# Patient Record
Sex: MALE | Race: WHITE
[De-identification: names, ages, dates, MRNs, and addresses within clinical notes are randomized per-mention and may not be internally consistent; named-entity substitution may affect disease eponyms.]

---

## 2017-03-29 ENCOUNTER — HOSPITAL ENCOUNTER (OUTPATIENT)
Dept: HOSPITAL 45 - C.LABBC | Age: 57
Discharge: HOME | End: 2017-03-29
Attending: ORTHOPAEDIC SURGERY
Payer: COMMERCIAL

## 2017-03-29 DIAGNOSIS — M48.06: Primary | ICD-10-CM

## 2017-03-29 LAB
ALP SERPL-CCNC: 75 U/L (ref 45–117)
ALT SERPL-CCNC: 70 U/L (ref 12–78)
ANION GAP SERPL CALC-SCNC: 6 MMOL/L (ref 3–11)
AST SERPL-CCNC: 51 U/L (ref 15–37)
BASOPHILS # BLD: 0.02 K/UL (ref 0–0.2)
BASOPHILS NFR BLD: 0.3 %
BUN SERPL-MCNC: 16 MG/DL (ref 7–18)
CHLORIDE SERPL-SCNC: 105 MMOL/L (ref 98–107)
CO2 SERPL-SCNC: 27 MMOL/L (ref 21–32)
COMPLETE: YES
CREAT SERPL-MCNC: 1.2 MG/DL (ref 0.6–1.4)
EOSINOPHIL NFR BLD AUTO: 214 K/UL (ref 130–400)
HCT VFR BLD CALC: 46.1 % (ref 42–52)
IG%: 0.2 %
IMM GRANULOCYTES NFR BLD AUTO: 32.2 %
LYMPHOCYTES # BLD: 1.85 K/UL (ref 1.2–3.4)
MCH RBC QN AUTO: 32.3 PG (ref 25–34)
MCHC RBC AUTO-ENTMCNC: 34.7 G/DL (ref 32–36)
MCV RBC AUTO: 92.9 FL (ref 80–100)
MONOCYTES NFR BLD: 11 %
NEUTROPHILS # BLD AUTO: 1.4 %
NEUTROPHILS NFR BLD AUTO: 54.9 %
PMV BLD AUTO: 10.4 FL (ref 7.4–10.4)
POTASSIUM SERPL-SCNC: 4.2 MMOL/L (ref 3.5–5.1)
RBC # BLD AUTO: 4.96 M/UL (ref 4.7–6.1)
SODIUM SERPL-SCNC: 138 MMOL/L (ref 136–145)
WBC # BLD AUTO: 5.75 K/UL (ref 4.8–10.8)

## 2018-04-20 ENCOUNTER — HOSPITAL ENCOUNTER (INPATIENT)
Dept: HOSPITAL 45 - C.EDB | Age: 58
LOS: 2 days | Discharge: HOME | DRG: 872 | End: 2018-04-22
Attending: HOSPITALIST | Admitting: HOSPITALIST
Payer: COMMERCIAL

## 2018-04-20 VITALS — SYSTOLIC BLOOD PRESSURE: 126 MMHG | TEMPERATURE: 97.52 F | HEART RATE: 68 BPM | DIASTOLIC BLOOD PRESSURE: 73 MMHG

## 2018-04-20 VITALS
HEIGHT: 72 IN | HEIGHT: 72 IN | BODY MASS INDEX: 40.55 KG/M2 | WEIGHT: 299.39 LBS | WEIGHT: 299.39 LBS | BODY MASS INDEX: 40.55 KG/M2

## 2018-04-20 VITALS — OXYGEN SATURATION: 98 %

## 2018-04-20 DIAGNOSIS — I10: ICD-10-CM

## 2018-04-20 DIAGNOSIS — G89.29: ICD-10-CM

## 2018-04-20 DIAGNOSIS — Z79.82: ICD-10-CM

## 2018-04-20 DIAGNOSIS — Z79.84: ICD-10-CM

## 2018-04-20 DIAGNOSIS — A41.9: Primary | ICD-10-CM

## 2018-04-20 DIAGNOSIS — F17.200: ICD-10-CM

## 2018-04-20 DIAGNOSIS — R73.03: ICD-10-CM

## 2018-04-20 DIAGNOSIS — Z82.49: ICD-10-CM

## 2018-04-20 DIAGNOSIS — Z79.899: ICD-10-CM

## 2018-04-20 DIAGNOSIS — Z79.891: ICD-10-CM

## 2018-04-20 DIAGNOSIS — Z80.7: ICD-10-CM

## 2018-04-20 DIAGNOSIS — N49.2: ICD-10-CM

## 2018-04-20 DIAGNOSIS — M54.9: ICD-10-CM

## 2018-04-20 LAB
ALBUMIN SERPL-MCNC: 4.1 GM/DL (ref 3.4–5)
ALP SERPL-CCNC: 77 U/L (ref 45–117)
ALT SERPL-CCNC: 37 U/L (ref 12–78)
AST SERPL-CCNC: 21 U/L (ref 15–37)
BASOPHILS # BLD: 0.02 K/UL (ref 0–0.2)
BASOPHILS NFR BLD: 0.2 %
BUN SERPL-MCNC: 12 MG/DL (ref 7–18)
CALCIUM SERPL-MCNC: 8.6 MG/DL (ref 8.5–10.1)
CO2 SERPL-SCNC: 25 MMOL/L (ref 21–32)
CREAT SERPL-MCNC: 1.13 MG/DL (ref 0.6–1.4)
EOS ABS #: 0.02 K/UL (ref 0–0.5)
EOSINOPHIL NFR BLD AUTO: 243 K/UL (ref 130–400)
GLUCOSE SERPL-MCNC: 102 MG/DL (ref 70–99)
HCT VFR BLD CALC: 44.4 % (ref 42–52)
HGB BLD-MCNC: 15.2 G/DL (ref 14–18)
IG#: 0.03 K/UL (ref 0–0.02)
IMM GRANULOCYTES NFR BLD AUTO: 6.9 %
INR PPP: 1.1 (ref 0.9–1.1)
LYMPHOCYTES # BLD: 0.87 K/UL (ref 1.2–3.4)
MCH RBC QN AUTO: 31.8 PG (ref 25–34)
MCHC RBC AUTO-ENTMCNC: 34.2 G/DL (ref 32–36)
MCV RBC AUTO: 92.9 FL (ref 80–100)
MONO ABS #: 0.93 K/UL (ref 0.11–0.59)
MONOCYTES NFR BLD: 7.4 %
NEUT ABS #: 10.69 K/UL (ref 1.4–6.5)
NEUTROPHILS # BLD AUTO: 0.2 %
NEUTROPHILS NFR BLD AUTO: 85.1 %
PMV BLD AUTO: 9.4 FL (ref 7.4–10.4)
POTASSIUM SERPL-SCNC: 4.1 MMOL/L (ref 3.5–5.1)
PROT SERPL-MCNC: 7.8 GM/DL (ref 6.4–8.2)
RED CELL DISTRIBUTION WIDTH CV: 13.4 % (ref 11.5–14.5)
RED CELL DISTRIBUTION WIDTH SD: 46 FL (ref 36.4–46.3)
SODIUM SERPL-SCNC: 135 MMOL/L (ref 136–145)
WBC # BLD AUTO: 12.56 K/UL (ref 4.8–10.8)

## 2018-04-20 RX ADMIN — ENOXAPARIN SODIUM SCH MG: 40 INJECTION SUBCUTANEOUS at 22:00

## 2018-04-20 RX ADMIN — LISINOPRIL SCH MG: 20 TABLET ORAL at 22:00

## 2018-04-20 RX ADMIN — ATORVASTATIN CALCIUM SCH MG: 40 TABLET, FILM COATED ORAL at 22:00

## 2018-04-20 RX ADMIN — CEFEPIME SCH MLS/MIN: 2 INJECTION, POWDER, FOR SOLUTION INTRAVENOUS at 22:01

## 2018-04-20 NOTE — DIAGNOSTIC IMAGING REPORT
PELVIS W/IV CONT ONLY (CT)



CLINICAL HISTORY: Nodule Abscess



TECHNIQUE: Transaxial acquisition with multi axial reformatted images



COMPARISON STUDY:  7/20/2014



FINDINGS: Gallstones are again noted. Bowel pattern is nonobstructive.



The bladder is midline. There are no bladder calcifications. There is no free

fluid within the pelvic cul-de-sac.



There are bilateral fat-containing inguinal hernias. There is evidence for

infiltrative change superior aspect of the right scrotal region and right soft

tissues lateral to the base of the penis. Scrotal wall appears to be thickened.



Several reactive nodes are present within the inguinal region. There is no

evidence for drainable abscess or collection in



IMPRESSION:  

1. Inflammatory process and/or cellulitis involving the scrotum, right inguinal

canal region, and base of the penis region. 

2. Bilateral fat-containing inguinal hernias.

3. No evidence for drainable abscess or collection.

4. Several reactive inguinal nodes with the study otherwise unremarkable.

5. Scrotal and testicular ultrasound may be of further assistance









The above report was generated using voice recognition software.  It may contain

grammatical, syntax or spelling errors.







Electronically signed by:  Ajit Cunningham M.D.

4/20/2018 6:53 PM



Dictated Date/Time:  4/20/2018 6:49 PM

## 2018-04-20 NOTE — EMERGENCY ROOM VISIT NOTE
History


Report prepared by Jose:  Lulu Doran


Under the Supervision of:  Dr. Rafal Felipe M.D.


First contact with patient:  17:36


Chief Complaint:  WOUND INFECTION


Stated Complaint:  GROIN LUMPS AND DRAINAGE


Nursing Triage Summary:  


patient to ED via triage, referred by urology, states "I had an abscess in my 


groin drained yesterday, I woke up this morning feeling terrible, so I saw them 


again and they sent me over here."





History of Present Illness


The patient is a 57 year old male who presents to the Emergency Room with 

complaints of a possible wound infection. He reports about 10 days ago, he 

developed an abscess in his right groin. He rates his discomfort as a 10/10 in 

severity. Yesterday, he had the abscess drained by [facility] and he was placed on antibiotics. Today, the patient states the abscess 

feels like it has "moved up". He developed a fever of 100 degrees this morning, 

so he saw Urology again this afternoon and was referred here to the ED. The 

patient has had abscesses before but denies any history of MRSA. He complains 

of lower abdominal pain. He denies any urinary symptoms or diarrhea. He does 

take daily medications for hypertension.





   Source of History:  patient


   Onset:  10 days PTA


   Position:  pelvis (right groin)


   Symptom Intensity:  10/10


   Quality:  sharp


   Timing:  worsening


   Associated Symptoms:  + fevers, + abdominal pain (lower abdominal pain), No 

diarrhea, No urinary symptoms





Review of Systems


See HPI for pertinent positives & negatives. A total of 10 systems reviewed and 

were otherwise negative.





Past Medical & Surgical


Medical Problems:


(1) Gallstones


(2) Kidney stone


(3) Sepsis








Social History


Smoking Status:  Current Every Day Smoker


Alcohol Use:  occasionally


Drug Use:  none


Marital Status:  single


Housing Status:  lives alone


Occupation Status:  employed





Current/Historical Medications


Scheduled


Amlodipine (Norvasc), 20 MG PO QAM


Aspirin (Aspirin Ec), 81 MG PO QAM


Atorvastatin (Lipitor), 20 MG PO HS


Atorvastatin (Lipitor), 40 MG PO HS


Cephalexin Monohydrate (Keflex), 500 MG PO TID


Diclofenac (Voltaren), 75 MG PO BID


Lisinopril (Lisinopril), 20 MG PO BID


Metformin HCl (Metformin HCl), 500 MG PO BID





Allergies


Coded Allergies:  


     No Known Allergies (Unverified , NONE, 4/20/18)





Physical Exam


Vital Signs











  Date Time  Temp Pulse Resp B/P (MAP) Pulse Ox O2 Delivery O2 Flow Rate FiO2


 


4/20/18 20:10  68 16 136/71 98   


 


4/20/18 18:56  66 16 115/59 94 Room Air  


 


4/20/18 18:30  69 19 152/89 94 Room Air  


 


4/20/18 16:07 37.8 86 20 141/76 94 Room Air  











Physical Exam


Constitutional: Vital signs reviewed.


Eyes: Pupils are equal round reactive to light.  Conjunctiva are noninjected.  


ENT: Pharynx is clear without erythema or exudate.  Mucous membranes are moist.

  Neck supple without meningeal signs.


Respiratory: Clear to auscultation bilaterally.  Breath sounds are equal 

bilaterally. 


Cardiovascular: Regular rate and rhythm.  No rubs or gallops.


GI: Soft, nondistended and nontender.  Bowel sounds are present.


: 12 cm cutaneous abscess extending from right posterior scrotum to the right 

groin, no signs of crepitus, no Williams's gangrene, no testicular tenderness. 


Musculoskeletal: No peripheral edema.  No lower extremity tenderness. 


Integumentary: No cyanosis.


Neurological: The patient is awake and alert.  No focal deficits.


Psychiatric: Normal affect.





Medical Decision & Procedures


ER Provider


Diagnostic Interpretation:


Radiology results as stated below per my review and the radiologist's 

interpretation:





PELVIS W/IV CONT ONLY (CT)





CLINICAL HISTORY: Nodule Abscess





TECHNIQUE: Transaxial acquisition with multi axial reformatted images





COMPARISON STUDY:  7/20/2014





FINDINGS: Gallstones are again noted. Bowel pattern is nonobstructive.





The bladder is midline. There are no bladder calcifications. There is no free


fluid within the pelvic cul-de-sac.





There are bilateral fat-containing inguinal hernias. There is evidence for


infiltrative change superior aspect of the right scrotal region and right soft


tissues lateral to the base of the penis. Scrotal wall appears to be thickened.





Several reactive nodes are present within the inguinal region. There is no


evidence for drainable abscess or collection in





IMPRESSION:  


1. Inflammatory process and/or cellulitis involving the scrotum, right inguinal


canal region, and base of the penis region. 


2. Bilateral fat-containing inguinal hernias.


3. No evidence for drainable abscess or collection.


4. Several reactive inguinal nodes with the study otherwise unremarkable.


5. Scrotal and testicular ultrasound may be of further assistance





The above report was generated using voice recognition software.  It may contain


grammatical, syntax or spelling errors.





Electronically signed by:  Ajit Cunningham M.D.


4/20/2018 6:53 PM





Laboratory Results


4/20/18 15:46








Red Blood Count 4.78, Mean Corpuscular Volume 92.9, Mean Corpuscular Hemoglobin 

31.8, Mean Corpuscular Hemoglobin Concent 34.2, Mean Platelet Volume 9.4, 

Neutrophils (%) (Auto) 85.1, Lymphocytes (%) (Auto) 6.9, Monocytes (%) (Auto) 

7.4, Eosinophils (%) (Auto) 0.2, Basophils (%) (Auto) 0.2, Neutrophils # (Auto) 

10.69, Lymphocytes # (Auto) 0.87, Monocytes # (Auto) 0.93, Eosinophils # (Auto) 

0.02, Basophils # (Auto) 0.02





4/20/18 15:46

















Test


  4/20/18


15:46 4/20/18


19:49


 


White Blood Count


  12.56 K/uL


(4.8-10.8) 


 


 


Red Blood Count


  4.78 M/uL


(4.7-6.1) 


 


 


Hemoglobin


  15.2 g/dL


(14.0-18.0) 


 


 


Hematocrit 44.4 % (42-52)  


 


Mean Corpuscular Volume


  92.9 fL


() 


 


 


Mean Corpuscular Hemoglobin


  31.8 pg


(25-34) 


 


 


Mean Corpuscular Hemoglobin


Concent 34.2 g/dl


(32-36) 


 


 


Platelet Count


  243 K/uL


(130-400) 


 


 


Mean Platelet Volume


  9.4 fL


(7.4-10.4) 


 


 


Neutrophils (%) (Auto) 85.1 %  


 


Lymphocytes (%) (Auto) 6.9 %  


 


Monocytes (%) (Auto) 7.4 %  


 


Eosinophils (%) (Auto) 0.2 %  


 


Basophils (%) (Auto) 0.2 %  


 


Neutrophils # (Auto)


  10.69 K/uL


(1.4-6.5) 


 


 


Lymphocytes # (Auto)


  0.87 K/uL


(1.2-3.4) 


 


 


Monocytes # (Auto)


  0.93 K/uL


(0.11-0.59) 


 


 


Eosinophils # (Auto)


  0.02 K/uL


(0-0.5) 


 


 


Basophils # (Auto)


  0.02 K/uL


(0-0.2) 


 


 


RDW Standard Deviation


  46.0 fL


(36.4-46.3) 


 


 


RDW Coefficient of Variation


  13.4 %


(11.5-14.5) 


 


 


Immature Granulocyte % (Auto) 0.2 %  


 


Immature Granulocyte # (Auto)


  0.03 K/uL


(0.00-0.02) 


 


 


Anion Gap


  8.0 mmol/L


(3-11) 


 


 


Est Creatinine Clear Calc


Drug Dose 102.6 ml/min 


  


 


 


Estimated GFR (


American) 82.6 


  


 


 


Estimated GFR (Non-


American 71.3 


  


 


 


BUN/Creatinine Ratio 10.3 (10-20)  


 


Calcium Level


  8.6 mg/dl


(8.5-10.1) 


 


 


Magnesium Level


  2.2 mg/dl


(1.8-2.4) 


 


 


Total Bilirubin


  0.7 mg/dl


(0.2-1) 


 


 


Aspartate Amino Transf


(AST/SGOT) 21 U/L (15-37) 


  


 


 


Alanine Aminotransferase


(ALT/SGPT) 37 U/L (12-78) 


  


 


 


Alkaline Phosphatase


  77 U/L


() 


 


 


Total Protein


  7.8 gm/dl


(6.4-8.2) 


 


 


Albumin


  4.1 gm/dl


(3.4-5.0) 


 


 


Globulin


  3.7 gm/dl


(2.5-4.0) 


 


 


Albumin/Globulin Ratio 1.1 (0.9-2)  


 


Thyroid Stimulating Hormone


(TSH) 0.514 uIu/ml


(0.300-4.500) 


 


 


Lactic Acid Level


  


  0.8 mmol/L


(0.4-2.0)





Laboratory results as reviewed by me.





Medications Administered











 Medications


  (Trade)  Dose


 Ordered  Sig/Nicole


 Route  Start Time


 Stop Time Status Last Admin


Dose Admin


 


 Piperacillin Sod/


 Tazobactam Sod


  (Zosyn Iv)  4.5 gm  NOW  STAT


 IV  4/20/18 17:49


 4/20/18 17:50 DC 4/20/18 18:27


4.5 GM


 


 Vancomycin HCl


 2750 mg/Sodium


 Chloride  555 ml @ 


 200 mls/hr  NOW  STAT


 IV  4/20/18 18:16


 4/20/18 21:02  4/20/18 18:56


200 MLS/HR











ED Course


1737: The patient was evaluated in room C7. A complete history and physical 

exam was performed.





1744: I let the patient know I cannot let him drive home if we give him pain 

medication and he states he will get a ride home. 





1749: Zosyn 4.5 gm IV. 





1816: Vancomycin HCl 2750 mg/ ml @ 200 mls/hr IV. 





1822: I reviewed the patients records from [facility]. He had a needle drainage 

of the abscess yesterday and was placed on Keflex and Bactrim. A gram stain 

showed many gram negative rods and gram positive cocci in clusters. No culture 

growth yet.  





1901: I reevaluated the patient. He declines any pain medication and states as 

long as he is laying still he feels fine. I discussed his test results and my 

recommendation he remain in the hospital for further evaluation and management 

and he verbalized complete understanding and agreement. 





1908: I discussed the patients case with Dr. Olson, [facility]. 

He recommends the patient be further evaluated by the inpatient hospital 

medicine team for IV antibiotics and he will see the patient in consult. 





1915: I discussed the patients case with Soila Rendon PA-C, [facility] 

Hospitalist. The patient will be further evaluated.





Medical Decision


This is a 57-year-old male presents with an infection to his groin.  

Differential diagnosis includes cutaneous abscess, scrotal abscess, Williams's 

gangrene, cellulitis, phlegmon.  I did perform a limited focused review of 

portions of the patient's old chart on the electronic medical record. The 

patient has had no recent pertinent visits to this hospital.





I did evaluate the patient as noted above.  The patient is presenting with an 

abscess to his groin.  He does not have evidence of crepitus or Williams's 

gangrene.  He did start antibiotics yesterday but has worsening of his symptoms 

as well as constitutional symptoms such as fever.  I did obtain the records 

from [facility] and his Gram stain showed gram-negative rods and gram-positive 

cocci in clusters.  Blood cultures were obtained.  IV access was established.  

I did treat him with IV Zosyn and vancomycin.    I did order and review the 

patient's blood work as noted in the electronic medical record.  I did order a 

CT of the pelvis.  I did review the images myself as well as the radiology 

report as described above.  He does have cellulitis and infection as described 

above.  No drainable abscess was noted.  I did discuss the case with the 

urologist on-call who recommended IV antibiotics and hospitalization.  No acute 

surgical intervention was needed.  He stated he would see the patient later 

today or tomorrow.  I did discuss case with the hospitalist and .





Medication Reconcilliation


Current Medication List:  was personally reviewed by me





Blood Pressure Screening


Patient's blood pressure:  Elevated blood pressure


Blood pressure disposition:  Referred to PCP (The patients elevated blood 

pressure will be further managed by the inpatient hospital medicine team)





Consults


Time Called:  1859


Consulting Physician:  Dr. Olson [facility]


Returned Call:  1908


I discussed the patients case with Dr. Olson, [facility]. He 

recommends the patient be further evaluated by the inpatient hospital medicine 

team for IV antibiotics and he will see the patient in consult.


Additional Consults:  


   Time Called:  1910


   Consulted Physician:  Gini Pfeiffer PA-C


   Returned Call:  1915


Additional Comments:


I discussed the patients case with Gini Pfeiffer PA-C. The 

patient will be further evaluated.





Impression





 Primary Impression:  


 Cellulitis of scrotum





Scribe Attestation


The scribe's documentation has been prepared under my direct and personally 

reviewed by me in its entirety. I confirm that the note above accurately 

reflects all work, treatment, procedures, and medical decision making performed 

by me.





Departure Information


Dispostion


Being Evaluated By Hospitalist





Referrals


LONG Heredia M.D. (MEDICAL) (PCP)





Patient Instructions


My [facility]

## 2018-04-20 NOTE — HISTORY & PHYSICAL EXAMINATION
DATE OF ADMISSION:  04/20/2018

 

CHIEF COMPLAINT:  Scrotal swelling.

 

HISTORY OF PRESENT ILLNESS: 



History obtained from patient and records.  



Medical history significant for hypertension, prediabetes, chronic back pain, 
ongoing tobacco

abuse.  



Last week, the patient noted a lump on his right scrotum, thinks it

is a pimple. Occasional manipulation.  

Progressive scrotal swelling noted extending to the groin. 

He was seen at PCP's office yesterday.  

Fluctuant R groin abscess measuring was 4 cm as per note was drained. 

Patient was sent home on Bactrim and Keflex Rx. 

Initial wound Gram stain shows gram positive cocci, GNR. 

Increased groin pain and flu-like symptoms.

Patient returned to the PCP's office today.

Sent to the Emergency Room.

Patient received IV Vancomycin and Zosyn for scrotal swelling.

 

MEDICAL HISTORY:  As above.

 

SURGICAL HISTORY:  Hammertoe procedure.

 

MEDICATIONS:  Home medications include aspirin, metformin, atorvastatin,

Keflex, Bactrim, Vicodin, and lisinopril.

 

ALLERGIES:  No drug allergies.

 

FAMILY HISTORY:  Heart disease and lymphoma.

 

PERSONAL AND SOCIAL HISTORY:  One-half pack daily.  No alcohol or beverage

intake.  .

 

REVIEW OF SYSTEMS:  As per HPI.  All 10 systems reviewed, all other ROS 
negative.

 

PHYSICAL EXAMINATION:

VITAL SIGNS:  Blood pressure was noted to be 141/80, pulse rate 66, RR 20,

temperature 37.8, O2 saturations 94% on room air.

GENERAL:  Noted to be obese, slightly uncomfortable, in no acute distress.

SKIN:  Normal color, warm.

HEENT:  Pink palpebral conjunctivae.  No ptosis.  Dry mucosa.

NECK:  Short, supple.

CHEST:  Decreased breath sounds.  No tenderness.  Occasional wheeze.

HEART:  RRR, no murmur.

ABDOMEN:  Some distention. NT

SKIN:  Right groin swelling/scrotal swelling, some tenderness, no fluctuance.

EXTREMITIES:  No LE edema, no tenderness no other gross deformities.

NEUROLOGIC: Coherent, no gross focality.

 

LABORATORY DATA:  Hemoglobin 14.2, hematocrit 41.4, white cell count 12.3,

platelets 240.  Sodium 135, potassium 4.5, chloride 103, CO2 of 35, BUN 12,

creatinine 1, glucose 102.

 

Pelvic CT showed scrotal cellulitis extending to right inguinal area and base 
of the penis;

inguinal hernia, no evidence of drainable abscess collection.  severe reactive 
lymph nodes.  

Scrotal ultrasound recommended.  

 

ASSESSMENT:

1.  Sepsis secondary to scrotal abscess/cellulitis sp drainage

gram negative rods, gram positive cocci on initial Gram stain results.

2.  Hypertension, stable.

3.  Prediabetes, well controlled as of recent HgA1c 5.7 in November 2017.

4.  Tobacco abuse.

 

PLAN:  

GMF

Follow final outpx CS.  Vancomycin and Cefepime for now.

Check lactic acid.  IVF

Local measures for cellulitis.  

Scrotal/testicular ultrasound

Urology consult for scrotal swelling.  

(ER provider already in touch with Dr. Olson.)

Nicotine patch as needed

DVT Prophylaxis.  Lovenox  subQ.  

Full code.

 

 

 

MTDD

## 2018-04-20 NOTE — PHARMACY PROGRESS NOTE
Pharmacy Antibiotic Consult


Date of Service:


Apr 20, 2018.


Pharmacy Dosing Scope


Pharmacy is consulted to initiate vancomycin/cefepime IV dosing therapy, order 

appropriate labs and adjust drug dose/frequency.





Subjective


The patient is a 57 year old male admitted on .





Objective


Height (Feet):  6


Height (Inches):  0


Weight (Kilograms):  138.000


Lab Results (24hrs):











Test


  4/20/18


15:46 4/20/18


19:49 4/20/18


20:53


 


White Blood Count


  12.56 K/uL


(4.8-10.8) 


  


 


 


Red Blood Count


  4.78 M/uL


(4.7-6.1) 


  


 


 


Hemoglobin


  15.2 g/dL


(14.0-18.0) 


  


 


 


Hematocrit 44.4 % (42-52)   


 


Mean Corpuscular Volume


  92.9 fL


() 


  


 


 


Mean Corpuscular Hemoglobin


  31.8 pg


(25-34) 


  


 


 


Mean Corpuscular Hemoglobin


Concent 34.2 g/dl


(32-36) 


  


 


 


Platelet Count


  243 K/uL


(130-400) 


  


 


 


Mean Platelet Volume


  9.4 fL


(7.4-10.4) 


  


 


 


Neutrophils (%) (Auto) 85.1 %   


 


Lymphocytes (%) (Auto) 6.9 %   


 


Monocytes (%) (Auto) 7.4 %   


 


Eosinophils (%) (Auto) 0.2 %   


 


Basophils (%) (Auto) 0.2 %   


 


Neutrophils # (Auto)


  10.69 K/uL


(1.4-6.5) 


  


 


 


Lymphocytes # (Auto)


  0.87 K/uL


(1.2-3.4) 


  


 


 


Monocytes # (Auto)


  0.93 K/uL


(0.11-0.59) 


  


 


 


Eosinophils # (Auto)


  0.02 K/uL


(0-0.5) 


  


 


 


Basophils # (Auto)


  0.02 K/uL


(0-0.2) 


  


 


 


RDW Standard Deviation


  46.0 fL


(36.4-46.3) 


  


 


 


RDW Coefficient of Variation


  13.4 %


(11.5-14.5) 


  


 


 


Immature Granulocyte % (Auto) 0.2 %   


 


Immature Granulocyte # (Auto)


  0.03 K/uL


(0.00-0.02) 


  


 


 


Sodium Level


  135 mmol/L


(136-145) 


  


 


 


Potassium Level


  4.1 mmol/L


(3.5-5.1) 


  


 


 


Chloride Level


  103 mmol/L


() 


  


 


 


Carbon Dioxide Level


  25 mmol/L


(21-32) 


  


 


 


Anion Gap


  8.0 mmol/L


(3-11) 


  


 


 


Blood Urea Nitrogen


  12 mg/dl


(7-18) 


  


 


 


Creatinine


  1.13 mg/dl


(0.60-1.40) 


  


 


 


Est Creatinine Clear Calc


Drug Dose 102.6 ml/min 


  


  


 


 


Estimated GFR (


American) 82.6 


  


  


 


 


Estimated GFR (Non-


American 71.3 


  


  


 


 


BUN/Creatinine Ratio 10.3 (10-20)   


 


Random Glucose


  102 mg/dl


(70-99) 


  


 


 


Calcium Level


  8.6 mg/dl


(8.5-10.1) 


  


 


 


Magnesium Level


  2.2 mg/dl


(1.8-2.4) 


  


 


 


Total Bilirubin


  0.7 mg/dl


(0.2-1) 


  


 


 


Aspartate Amino Transf


(AST/SGOT) 21 U/L (15-37) 


  


  


 


 


Alanine Aminotransferase


(ALT/SGPT) 37 U/L (12-78) 


  


  


 


 


Alkaline Phosphatase


  77 U/L


() 


  


 


 


Total Protein


  7.8 gm/dl


(6.4-8.2) 


  


 


 


Albumin


  4.1 gm/dl


(3.4-5.0) 


  


 


 


Globulin


  3.7 gm/dl


(2.5-4.0) 


  


 


 


Albumin/Globulin Ratio 1.1 (0.9-2)   


 


Thyroid Stimulating Hormone


(TSH) 0.514 uIu/ml


(0.300-4.500) 


  


 


 


Lactic Acid Level


  


  0.8 mmol/L


(0.4-2.0) 


 








Micro Results:











 Date/Time


Source Procedure


Growth Status


 


 


 4/20/18 17:26


Blood Blood Culture


Pending Received


 


 4/20/18 15:46


Blood Blood Culture


Pending Received











Assessment & Plan


Assessment:


56 yo male admitted for sst/groin abscess


WBC 12.6, Tmax 37.8


Starting vancomycin/cefepime








Plan:


Loading dose:   2750 mg (20mg/kg) IV X 1 dose then:





2000 mg (14 mg/kg) q12H


   Population PK: SCR 1.13, Age max CrCl 97, Estimated T1/2 ~8 hours


   Extended frequency/lower dose due to risk of accumulation with BMI > 35, but 

may also be difficult to achieve therapeutic levels given age/BMI





Goal trough level estimate:  between 15-20 mcg/mL.





Trough level ordered for 4/22 @0330, prior to third dose due to risk of 

accumulation














Pharmacy will continue to follow and will adjust dose/frequency as necessary.  

Thank you

## 2018-04-20 NOTE — DIAGNOSTIC IMAGING REPORT
(TESTICULAR) SCROTUM-CONT



HISTORY: Pain. Edema.  scrotal swelling



COMPARISON:  CT same date



FINDINGS:



Right testis: Uniform in echogenicity with a maximum dimension of 4.1 cm. Normal

vascular flow



Left testis:  Maximum dimension 4.5 cm with normal vascular flow



Findings of generalized scrotal edema



IMPRESSION:  



1. Normal testes bilaterally.





2. Generalized scrotal wall edema bilaterally consistent with nonspecific edema

versus cellulitis. No evidence for abscess. 









The above report was generated using voice recognition software.  It may contain

grammatical, syntax or spelling errors.







Electronically signed by:  Ajit Cunningham M.D.

4/20/2018 10:49 PM



Dictated Date/Time:  4/20/2018 10:47 PM

## 2018-04-20 NOTE — DIAGNOSTIC IMAGING REPORT
CHEST ONE VIEW PORTABLE



CLINICAL HISTORY: cough dyspnea



COMPARISON STUDY:  None



FINDINGS: The bones soft tissues and hemidiaphragms are normal. The

cardiomediastinal silhouette is normal. The lungs are clear. The pulmonary

vasculature is normal. 



IMPRESSION:  Negative chest. 











The above report was generated using voice recognition software.  It may contain

grammatical, syntax or spelling errors.









Electronically signed by:  Ajit Cunningham M.D.

4/20/2018 8:13 PM



Dictated Date/Time:  4/20/2018 8:13 PM

## 2018-04-21 VITALS
SYSTOLIC BLOOD PRESSURE: 115 MMHG | HEART RATE: 69 BPM | TEMPERATURE: 98.78 F | DIASTOLIC BLOOD PRESSURE: 72 MMHG | OXYGEN SATURATION: 93 %

## 2018-04-21 VITALS
HEART RATE: 58 BPM | SYSTOLIC BLOOD PRESSURE: 122 MMHG | OXYGEN SATURATION: 94 % | DIASTOLIC BLOOD PRESSURE: 71 MMHG | TEMPERATURE: 98.96 F

## 2018-04-21 VITALS
SYSTOLIC BLOOD PRESSURE: 115 MMHG | TEMPERATURE: 98.78 F | DIASTOLIC BLOOD PRESSURE: 68 MMHG | HEART RATE: 84 BPM | OXYGEN SATURATION: 91 %

## 2018-04-21 LAB
BASOPHILS # BLD: 0.02 K/UL (ref 0–0.2)
BASOPHILS NFR BLD: 0.2 %
BUN SERPL-MCNC: 13 MG/DL (ref 7–18)
CALCIUM SERPL-MCNC: 8.3 MG/DL (ref 8.5–10.1)
CO2 SERPL-SCNC: 28 MMOL/L (ref 21–32)
CREAT SERPL-MCNC: 1.05 MG/DL (ref 0.6–1.4)
EOS ABS #: 0.08 K/UL (ref 0–0.5)
EOSINOPHIL NFR BLD AUTO: 213 K/UL (ref 130–400)
GLUCOSE SERPL-MCNC: 103 MG/DL (ref 70–99)
HBA1C MFR BLD: 5.8 % (ref 4.5–5.6)
HCT VFR BLD CALC: 40.9 % (ref 42–52)
HGB BLD-MCNC: 13.7 G/DL (ref 14–18)
IG#: 0.02 K/UL (ref 0–0.02)
IMM GRANULOCYTES NFR BLD AUTO: 10.6 %
LYMPHOCYTES # BLD: 1.06 K/UL (ref 1.2–3.4)
MCH RBC QN AUTO: 31.3 PG (ref 25–34)
MCHC RBC AUTO-ENTMCNC: 33.5 G/DL (ref 32–36)
MCV RBC AUTO: 93.4 FL (ref 80–100)
MONO ABS #: 0.96 K/UL (ref 0.11–0.59)
MONOCYTES NFR BLD: 9.6 %
NEUT ABS #: 7.88 K/UL (ref 1.4–6.5)
NEUTROPHILS # BLD AUTO: 0.8 %
NEUTROPHILS NFR BLD AUTO: 78.6 %
PMV BLD AUTO: 9.3 FL (ref 7.4–10.4)
POTASSIUM SERPL-SCNC: 4.4 MMOL/L (ref 3.5–5.1)
RED CELL DISTRIBUTION WIDTH CV: 13.5 % (ref 11.5–14.5)
RED CELL DISTRIBUTION WIDTH SD: 46.3 FL (ref 36.4–46.3)
SODIUM SERPL-SCNC: 136 MMOL/L (ref 136–145)
WBC # BLD AUTO: 10.02 K/UL (ref 4.8–10.8)

## 2018-04-21 RX ADMIN — VANCOMYCIN HYDROCHLORIDE SCH MLS/HR: 1 INJECTION, POWDER, LYOPHILIZED, FOR SOLUTION INTRAVENOUS at 16:30

## 2018-04-21 RX ADMIN — CEFEPIME SCH MLS/MIN: 2 INJECTION, POWDER, FOR SOLUTION INTRAVENOUS at 20:15

## 2018-04-21 RX ADMIN — AMLODIPINE BESYLATE SCH MG: 5 TABLET ORAL at 07:47

## 2018-04-21 RX ADMIN — LISINOPRIL SCH MG: 20 TABLET ORAL at 20:10

## 2018-04-21 RX ADMIN — VANCOMYCIN HYDROCHLORIDE SCH MLS/HR: 1 INJECTION, POWDER, LYOPHILIZED, FOR SOLUTION INTRAVENOUS at 05:40

## 2018-04-21 RX ADMIN — Medication SCH MG: at 07:47

## 2018-04-21 RX ADMIN — ENOXAPARIN SODIUM SCH MG: 40 INJECTION SUBCUTANEOUS at 20:11

## 2018-04-21 RX ADMIN — LISINOPRIL SCH MG: 20 TABLET ORAL at 07:48

## 2018-04-21 RX ADMIN — CEFEPIME SCH MLS/MIN: 2 INJECTION, POWDER, FOR SOLUTION INTRAVENOUS at 07:50

## 2018-04-21 RX ADMIN — ATORVASTATIN CALCIUM SCH MG: 40 TABLET, FILM COATED ORAL at 20:10

## 2018-04-21 NOTE — PROGRESS NOTE
Internal Med Progress Note


Date of Service:


Apr 21, 2018.


Provider Documentation:





SUBJECTIVE:





The patient was seen and examined in medical floor


Has had a small scrotal abscess drained as an outpatient yesterday and was 

getting Keflex and Bactrim


The redness and an joint swelling locally but worse and he came to the ER for 

further evaluation


He feels better since admission and his swelling and pain has been improving


Denies any fever chills or riders, no nausea and/or vomiting








OBJECTIVE:





Vital Signs-as noted below





Exam:


General-no distress at rest


Eyes-normal


ENT-normal


Neck-supple


Lungs-clear to auscultate bilaterally


Heart-S1-S2 regular, no murmur appreciated


Abdomen-benign, soft, nontender


Local exam of the scrotal area: Minimal swelling involving right total and also 

adjoining area over mons pubis


Tender adenopathy locally as well


Extremities-no edema


Neuro-no focal neuro deficit





Lab data as noted below.


ASSESSMENT & PLAN:











Sepsis secondary to scrotal abscess/cellulitis sp drainage


Gram negative rods, Gram positive cocci on initial Gram stain results.


Follow final outpx CS.  Vancomycin and Cefepime for now.


Check lactic acid-normal.  IVF


Local measures for cellulitis.  


Scrotal/testicular ultrasound;;. Generalized scrotal wall edema bilaterally 

consistent with nonspecific edema


versus cellulitis. No evidence for abscess. 


Urology consult for scrotal swelling.


Clinically better 


  


Hypertension, stable.





Prediabetes, well controlled as of recent HgA1c 5.7 in November 2017.





Tobacco abuse.


 


DVT Prophylaxis.  Lovenox  subQ.


 


Full code.


Vital Signs:











  Date Time  Temp Pulse Resp B/P (MAP) Pulse Ox O2 Delivery O2 Flow Rate FiO2


 


4/21/18 08:00      Room Air  


 


4/21/18 07:20 37.1 69 18 115/72 (86) 93 Room Air  


 


4/21/18 00:11 37.1 84 20 115/68 (84) 91 Room Air  


 


4/21/18 00:00      Room Air  


 


4/20/18 22:42     98 Room Air  


 


4/20/18 21:38 36.4 68 18 126/73    


 


4/20/18 20:10  68 16 136/71 98   


 


4/20/18 18:56  66 16 115/59 94 Room Air  


 


4/20/18 18:30  69 19 152/89 94 Room Air  


 


4/20/18 16:07 37.8 86 20 141/76 94 Room Air  








Lab Results:





Results Past 24 Hours








Test


  4/20/18


15:46 4/20/18


19:49 4/20/18


21:03 4/21/18


04:08 Range/Units


 


 


White Blood Count 12.56   10.02 4.8-10.8  K/uL


 


Red Blood Count 4.78   4.38 4.7-6.1  M/uL


 


Hemoglobin 15.2   13.7 14.0-18.0  g/dL


 


Hematocrit 44.4   40.9 42-52  %


 


Mean Corpuscular Volume 92.9   93.4   fL


 


Mean Corpuscular Hemoglobin 31.8   31.3 25-34  pg


 


Mean Corpuscular Hemoglobin


Concent 34.2


  


  


  33.5


  32-36  g/dl


 


 


Platelet Count 243   213 130-400  K/uL


 


Mean Platelet Volume 9.4   9.3 7.4-10.4  fL


 


Neutrophils (%) (Auto) 85.1   78.6  %


 


Lymphocytes (%) (Auto) 6.9   10.6  %


 


Monocytes (%) (Auto) 7.4   9.6  %


 


Eosinophils (%) (Auto) 0.2   0.8  %


 


Basophils (%) (Auto) 0.2   0.2  %


 


Neutrophils # (Auto) 10.69   7.88 1.4-6.5  K/uL


 


Lymphocytes # (Auto) 0.87   1.06 1.2-3.4  K/uL


 


Monocytes # (Auto) 0.93   0.96 0.11-0.59  K/uL


 


Eosinophils # (Auto) 0.02   0.08 0-0.5  K/uL


 


Basophils # (Auto) 0.02   0.02 0-0.2  K/uL


 


RDW Standard Deviation 46.0   46.3 36.4-46.3  fL


 


RDW Coefficient of Variation 13.4   13.5 11.5-14.5  %


 


Immature Granulocyte % (Auto) 0.2   0.2  %


 


Immature Granulocyte # (Auto) 0.03   0.02 0.00-0.02  K/uL


 


Sodium Level 135   136 136-145  mmol/L


 


Potassium Level 4.1   4.4 3.5-5.1  mmol/L


 


Chloride Level 103   106   mmol/L


 


Carbon Dioxide Level 25   28 21-32  mmol/L


 


Anion Gap 8.0   2.0 3-11  mmol/L


 


Blood Urea Nitrogen 12   13 7-18  mg/dl


 


Creatinine


  1.13


  


  


  1.05


  0.60-1.40


mg/dl


 


Est Creatinine Clear Calc


Drug Dose 102.6


  


  


  109.4


   ml/min


 


 


Estimated GFR (


American) 82.6


  


  


  90.3


   


 


 


Estimated GFR (Non-


American 71.3


  


  


  77.9


   


 


 


BUN/Creatinine Ratio 10.3   12.3 10-20  


 


Random Glucose 102   103 70-99  mg/dl


 


Estimated Average Glucose 120     mg/dl


 


Hemoglobin A1c 5.8    4.5-5.6  %


 


Calcium Level 8.6   8.3 8.5-10.1  mg/dl


 


Magnesium Level 2.2    1.8-2.4  mg/dl


 


Total Bilirubin 0.7    0.2-1  mg/dl


 


Aspartate Amino Transf


(AST/SGOT) 21


  


  


  


  15-37  U/L


 


 


Alanine Aminotransferase


(ALT/SGPT) 37


  


  


  


  12-78  U/L


 


 


Alkaline Phosphatase 77      U/L


 


Total Protein 7.8    6.4-8.2  gm/dl


 


Albumin 4.1    3.4-5.0  gm/dl


 


Globulin 3.7    2.5-4.0  gm/dl


 


Albumin/Globulin Ratio 1.1    0.9-2  


 


Thyroid Stimulating Hormone


(TSH) 0.514


  


  


  


  0.300-4.500


uIu/ml


 


Lactic Acid Level  0.8   0.4-2.0  mmol/L


 


Prothrombin Time


  


  


  11.1


  


  9.0-12.0


SECONDS


 


Prothromb Time International


Ratio 


  


  1.1


  


  0.9-1.1  


 


 


Hepatitis C Antibody Screen    NEG NEG  


 


Test


  4/21/18


05:30 


  


  


  Range/Units


 


 


Urine Color YELLOW     


 


Urine Appearance CLEAR    CLEAR  


 


Urine pH 6.5    4.5-7.5  


 


Urine Specific Gravity > 1.045    1.000-1.030  


 


Urine Protein NEG    NEG  


 


Urine Glucose (UA) NEG    NEG  


 


Urine Ketones NEG    NEG  


 


Urine Occult Blood NEG    NEG  


 


Urine Nitrite NEG    NEG  


 


Urine Bilirubin NEG    NEG  


 


Urine Urobilinogen NEG    NEG  


 


Urine Leukocyte Esterase NEG    NEG  








Microbiology Results


4/20/18 Blood Culture, Received


          Pending


4/20/18 Blood Culture, Received


          Pending

## 2018-04-21 NOTE — UROLOGY CONSULTATION
History


General


Date of Service:


Apr 21, 2018.


Primary Care Physician:


LONG Heredia M.D. (MEDICAL)


Pt seen a urologist before?:  Yes


If yes, why?:  Kidney Stones - 1989





History of Present Illness


56 y/o male with scrotal cellulitis.  1 week ago he noted a lump on his 

scrotum.  He thought it was just a pimple.  He has had these in the past on 

multiple occassions.  He normally just drains them and they get better.  This 

time it seemed to get worse.  He felt more swelling in the scrotum.  He went to 

his PCP.  Needle aspiration was performed.  Gram stain showed gram + cocci.  He 

was sent home with oral abx (Bactrim and Keflex).  Unfortunately, his sxs 

worsened over the next few days.  He was due to see PCP again however had a 

fever and was sent to the ED.





CT scan and GLADYS done.  No residual abscess cavity in the scrotum.  WBC 12.  

Blood cultures sent.  At this time the patient stated that he felt like the 

redness and swelling was spreading beyond the initial swelling.





He was admitted and started on Vanco and Zosyn.





Imaging


Imaging:  CT, Ultrasound





Laboratory


Labs were reviewed and are within normal limits unless listed below. Labs are 

available in the chart and at [facility]





Problem List


Medical Problems:


(1) Cellulitis of scrotum


Status: Acute  











Past History


diabetes, other


Past Surgical History:  other





Social History


Hx Tobacco Use In Past Year?:  Yes


Smoking:  less than 1 pack/day


Alcohol:  occasional


Marital status:  single


Occupation status:  employed





History of MDRO


No





Allergies


Coded Allergies:  


     No Known Allergies (Unverified , NONE, 4/20/18)





Medications


Home Medications:





Home Meds and Scripts








 Medications  Dose


 Route/Sig


 Max Daily Dose Days Date Category Dose


Instructions


 


 Aspirin Ec


  (Aspirin) 81 Mg


 Tab  81 Mg


 PO QAM


    4/20/18 Reported 


 


 Metformin HCl 500


 Mg Tab  500 Mg


 PO BID


    4/20/18 Reported 


 


 Lipitor


  (Atorvastatin


 Calcium) 40 Mg Tab  40 Mg


 PO HS


    4/20/18 Reported 


 


 Lisinopril 20 Mg


 Tab  20 Mg


 PO BID


    4/20/18 Reported 


 


 Keflex


  (Cephalexin


 Monohydrate) 500


 Mg Cap  500 Mg


 PO TID


    4/20/18 Reported 


 


 Voltaren


  (Diclofenac


 Sodium) 75 Mg


 Tabcr  75 Mg


 PO BID


    4/18/17 Reported  WITH FOOD


 


 Lipitor


  (Atorvastatin


 Calcium) 20 Mg Tab  20 Mg


 PO HS


    4/18/17 Reported 


 


 Norvasc


  (Amlodipine


 Besylate) 10 Mg


 Tab  20 Mg


 PO QAM


    4/18/17 Reported 








Inpatient Medications:





Current Inpatient Medications








 Medications


  (Trade)  Dose


 Ordered  Sig/Nicole


 Route  Start Time


 Stop Time Status Last Admin


Dose Admin


 


 Ioversol


  (Optiray 320)  100 ml  UD  PRN


 IV  4/20/18 18:00


 4/24/18 17:59   


 


 


 Acetaminophen


  (Tylenol Tab)  650 mg  Q6H  PRN


 PO  4/20/18 19:30


 5/20/18 19:29   


 


 


 Enoxaparin Sodium


  (Lovenox Inj)  40 mg  Q24H


 SQ  4/20/18 22:00


 5/20/18 21:59   


 


 


 Amlodipine


 Besylate


  (Norvasc Tab)  10 mg  QAM


 PO  4/21/18 09:00


 5/21/18 08:59  4/21/18 07:47


10 MG


 


 Aspirin


  (Ecotrin Tab)  81 mg  QAM


 PO  4/21/18 09:00


 5/21/18 08:59  4/21/18 07:47


81 MG


 


 Atorvastatin


 Calcium


  (Lipitor Tab)  40 mg  HS


 PO  4/20/18 21:00


 5/20/18 20:59  4/21/18 20:10


40 MG


 


 Lisinopril


  (Zestril Tab)  20 mg  BID


 PO  4/20/18 21:00


 5/20/18 20:59  4/21/18 20:10


20 MG


 


 Prochlorperazine


 Edisylate 5 mg/


 Syringe  5 ml @ 5


 mls/min  Q6H  PRN


 IV  4/20/18 20:00


 5/20/18 19:59   


 


 


 Ibuprofen


  (Advil Tab)  400 mg  Q6H  PRN


 PO  4/20/18 20:00


 5/20/18 19:59   


 


 


 Tramadol HCl


  (Ultram Tab)  not


 relieved


 by tylenol @   Q6H  PRN


 PO  4/20/18 20:00


 5/20/18 19:59   


 


 


 Morphine Sulfate


  (MoRPHine


 SULFATE INJ)  4 mg  Q6H  PRN


 IV  4/20/18 20:00


 5/4/18 19:59   


 


 


 Lorazepam


  (Ativan Inj)  0.5 mg  Q4H  PRN


 IV  4/20/18 20:00


 5/20/18 19:59   


 


 


 Miscellaneous


 Information


  (Consult)  1 ea  UD  PRN


 N/A  4/20/18 20:30


 5/20/18 20:29   


 


 


 Cefepime HCl


  (Consult)  1 ea  UD  PRN


 N/A  4/20/18 20:30


 5/20/18 20:29   


 


 


 Vancomycin HCl


 2000 mg/Sodium


 Chloride  540 ml @ 


 200 mls/hr  Q12H


 IV  4/21/18 04:00


 5/1/18 03:59  4/21/18 16:30


200 MLS/HR


 


 Cefepime HCl 2000


 mg/Syringe  20 ml @ 5


 mls/min  Q12H


 IV  4/20/18 21:00


 4/30/18 20:59  4/21/18 20:15


5 MLS/MIN











Review of Systems


Review of Systems


All Other Systems:  Reviewed and Negative





Physical Exam


Vital Signs:





Vital Signs Past 12 Hours








  Date Time  Temp Pulse Resp B/P (MAP) Pulse Ox O2 Delivery O2 Flow Rate FiO2


 


4/21/18 16:00      Room Air  








Physical Exam:


General Appearance:  WD/WN


ENT:  normal ENT inspection


Neck:  no adenopathy


Respiratory/Chest:  lungs clear


Cardiovascular:  regular rate, rhythm, no edema


Genitourinary - Male:  


   Urethral Meatus:  normal urethral meatus


   Testes:  normal testes


   Scrotum:  pertinent finding


Extremities:  normal range of motion, non-tender


Neurologic/Psychiatric:  no motor/sensory deficits, alert


Skin:  normal color


Lymphatic:  no adenopathy


Additional Comments:


Some redness and induration to the scrotum.  Midline underneath as well as the 

right groin.  The redness extends craniad to 2 fingerbreadths below the 

panicular crease.  Some tenderness on exam.  No crepitus.  No underlying fluid 

collection palpated.





Assessment & Plan


Assessment & Plan





(1) Sepsis


(2) Cellulitis of scrotum


Status:  Acute


No need for surgical intervention at this time.  No evidence of underlying 

fluid collection or fourniers gangrene.


Continue broad spectrum abx


Follow up cultures


Scrotal support:  Ice, supportive underwear


Will re-eval redness daily


Will need to convert to oral abx at somepoint.  Given lack of response with 

Bactrim, could consider Augmentin or Doxy.

## 2018-04-22 VITALS
HEART RATE: 54 BPM | OXYGEN SATURATION: 92 % | TEMPERATURE: 98.06 F | SYSTOLIC BLOOD PRESSURE: 114 MMHG | DIASTOLIC BLOOD PRESSURE: 74 MMHG

## 2018-04-22 VITALS
OXYGEN SATURATION: 92 % | HEART RATE: 54 BPM | DIASTOLIC BLOOD PRESSURE: 74 MMHG | SYSTOLIC BLOOD PRESSURE: 114 MMHG | TEMPERATURE: 98.06 F

## 2018-04-22 LAB
BUN SERPL-MCNC: 11 MG/DL (ref 7–18)
CALCIUM SERPL-MCNC: 8.4 MG/DL (ref 8.5–10.1)
CO2 SERPL-SCNC: 26 MMOL/L (ref 21–32)
CREAT SERPL-MCNC: 0.95 MG/DL (ref 0.6–1.4)
GLUCOSE SERPL-MCNC: 91 MG/DL (ref 70–99)
POTASSIUM SERPL-SCNC: 4.1 MMOL/L (ref 3.5–5.1)
SODIUM SERPL-SCNC: 137 MMOL/L (ref 136–145)

## 2018-04-22 RX ADMIN — LISINOPRIL SCH MG: 20 TABLET ORAL at 07:40

## 2018-04-22 RX ADMIN — CEFEPIME SCH MLS/MIN: 2 INJECTION, POWDER, FOR SOLUTION INTRAVENOUS at 08:36

## 2018-04-22 RX ADMIN — AMLODIPINE BESYLATE SCH MG: 5 TABLET ORAL at 07:40

## 2018-04-22 RX ADMIN — Medication SCH MG: at 07:40

## 2018-04-22 RX ADMIN — VANCOMYCIN HYDROCHLORIDE SCH MLS/HR: 1 INJECTION, POWDER, LYOPHILIZED, FOR SOLUTION INTRAVENOUS at 04:14

## 2018-04-22 NOTE — PROGRESS NOTE
Subjective


Date of Service:


Apr 22, 2018.


Subjective


Pt evaluation today including:  conversation w/ patient, physical exam


Voiding:  no voiding problems


No acute events overnight


Tolerating diet


No fevers.  No chills.


Still has pain and pressure in scrotum/groin when sitting up


Pt does not feel that redness/swelling has gotten worse





Problem List


Medical Problems:


(1) Cellulitis of scrotum


Status: Acute  











Review of Systems


All Other Systems:  Reviewed and Negative





Objective


Vital Signs











  Date Time  Temp Pulse Resp B/P (MAP) Pulse Ox O2 Delivery O2 Flow Rate FiO2


 


4/22/18 08:00      Room Air  


 


4/22/18 07:31 36.7 54 18 114/74 (87) 92 Room Air  


 


4/22/18 00:00      Room Air  


 


4/21/18 22:43 37.2 58 20 122/71 (88) 94 Room Air  


 


4/21/18 20:00      Room Air  


 


4/21/18 16:00      Room Air  











Physical Exam


Respiratory/Chest:  lungs clear


Cardiovascular:  regular rate, rhythm


Abdomen:  soft


Comments:


Redness line stable.  Induration on underside of scrotum.  No drainage.  No 

crepitus.





Laboratory Results





Last 24 Hours








Test


  4/22/18


03:25


 


Sodium Level 137 mmol/L 


 


Potassium Level 4.1 mmol/L 


 


Chloride Level 107 mmol/L 


 


Carbon Dioxide Level 26 mmol/L 


 


Anion Gap 4.0 mmol/L 


 


Blood Urea Nitrogen 11 mg/dl 


 


Creatinine 0.95 mg/dl 


 


Est Creatinine Clear Calc


Drug Dose 120.9 ml/min 


 


 


Estimated GFR (


American) 101.9 


 


 


Estimated GFR (Non-


American 87.9 


 


 


BUN/Creatinine Ratio 11.6 


 


Random Glucose 91 mg/dl 


 


Calcium Level 8.4 mg/dl 


 


Vancomycin Level Trough 13.0 mcg/ml 











Assessment and Plan





(1) Sepsis


(2) Cellulitis of scrotum


Given body habitus, I suspect it is going to be a slow recovery for this 

patient.  WBC normal.  Blood cx pending but don't suspect they will be 

positive.  Pt will need to be converted to oral abx.  After further review, pt 

only had 1 dose of Bactrim/Keflex before returning.  That combination would 

still be a reasonable option.  Rec atleast 2 weeks of outpt therapy.  Stable 

for DC to home today.  F/U as outpt in 2-3 weeks.

## 2018-04-22 NOTE — PROGRESS NOTE
Internal Med Progress Note


Date of Service:


Apr 22, 2018.


Provider Documentation:





SUBJECTIVE:





The patient was seen and examined in medical floor


Has had a small scrotal abscess drained as an outpatient yesterday and was 

getting Keflex and Bactrim


The redness and an joint swelling locally but worse and he came to the ER for 

further evaluation


He feels better since admission and his swelling and pain has been improving


Denies any fever chills or riders, no nausea and/or vomiting





Some pain and swelling locally 


No fever ,chill or rigors 


Clinically stable to be discharged








OBJECTIVE:





Vital Signs-as noted below





Exam:


General-no distress at rest


Eyes-normal


ENT-normal


Neck-supple


Lungs-clear to auscultate bilaterally


Heart-S1-S2 regular, no murmur appreciated


Abdomen-benign, soft, nontender


Local exam of the scrotal area: Minimal swelling involving right total and also 

adjoining area over mons pubis


Tender adenopathy locally as well-mild improvement 


Extremities-no edema


Neuro-no focal neuro deficit





Lab data as noted below.


ASSESSMENT & PLAN:











Sepsis secondary to scrotal abscess/cellulitis sp drainage


Gram negative rods, Gram positive cocci on initial Gram stain results.


Follow final outpx CS.  Vancomycin and Cefepime for now.


Check lactic acid-normal.  IVF


Local measures for cellulitis.  


Scrotal/testicular ultrasound;;. Generalized scrotal wall edema bilaterally 

consistent with nonspecific edema


versus cellulitis. No evidence for abscess. 


Urology consult for scrotal swelling.


Clinically better 


Blood culture -negative


No signs of spreading cellulitis


Will d/c current antibiotics and plan to continue Oral Antibiotics as advised 

by the Urologist


Keflex and Bctrim for 14 days in total


  


Hypertension, stable.





Prediabetes, well controlled as of recent HgA1c 5.7 in November 2017.





Tobacco abuse.


 


DVT Prophylaxis.  Lovenox  subQ.


 


Full code.


Discharge home today


Vital Signs:











  Date Time  Temp Pulse Resp B/P (MAP) Pulse Ox O2 Delivery O2 Flow Rate FiO2


 


4/22/18 08:00      Room Air  


 


4/22/18 07:31 36.7 54 18 114/74 (87) 92 Room Air  


 


4/22/18 00:00      Room Air  


 


4/21/18 22:43 37.2 58 20 122/71 (88) 94 Room Air  


 


4/21/18 20:00      Room Air  


 


4/21/18 16:00      Room Air  








Lab Results:





Results Past 24 Hours








Test


  4/22/18


03:25 Range/Units


 


 


Sodium Level 137 136-145  mmol/L


 


Potassium Level 4.1 3.5-5.1  mmol/L


 


Chloride Level 107   mmol/L


 


Carbon Dioxide Level 26 21-32  mmol/L


 


Anion Gap 4.0 3-11  mmol/L


 


Blood Urea Nitrogen 11 7-18  mg/dl


 


Creatinine


  0.95


  0.60-1.40


mg/dl


 


Est Creatinine Clear Calc


Drug Dose 120.9


   ml/min


 


 


Estimated GFR (


American) 101.9


   


 


 


Estimated GFR (Non-


American 87.9


   


 


 


BUN/Creatinine Ratio 11.6 10-20  


 


Random Glucose 91 70-99  mg/dl


 


Calcium Level 8.4 8.5-10.1  mg/dl


 


Vancomycin Level Trough


  13.0


  SEE COMMENT


mcg/ml

## 2018-04-22 NOTE — DISCHARGE INSTRUCTIONS
Discharge Instructions


Date of Service


Apr 22, 2018.





Admission


Reason for Admission:  Sepsis





Discharge


Discharge Diagnosis / Problem:  Right Scrotal abscess s/p I&D followed by 

spreading Cellulitis-Improved





Discharge Goals


Goal(s):  Prevent Disease Progression





Activity Recommendations


Activity Limitations:  resume your previous activity





.





Instructions / Follow-Up


Instructions / Follow-Up


Dr Heredia on 4/24/18 at 1PM.Please keep appointment with Urologist





Current Hospital Diet


Patient's current hospital diet: AHA Diet (Heart Healthy)





Discharge Diet


Recommended Diet:  Regular Diet





Pending Studies


Studies pending at discharge:  no





Laboratory Results





Hemoglobin A1c








Test


  4/20/18


15:46 Range/Units


 


 


Estimated Average Glucose 120   mg/dl


 


Hemoglobin A1c 5.8 H 4.5-5.6  %











Medical Emergencies








.


Who to Call and When:





Medical Emergencies:  If at any time you feel your situation is an emergency, 

please call 911 immediately.





.





Non-Emergent Contact


Non-Emergency issues call your:  Primary Care Provider





.





Past History


Medical & Surgical History:  


(1) Cellulitis of scrotum


(2) Sepsis


(3) Kidney stone


(4) Gallstones


.








"Provider Documentation" section prepared by Marcos Gannon.








.

## 2018-04-23 NOTE — DISCHARGE SUMMARY
Discharge Summary


Date of Service


Apr 23, 2018.





Discharge Summary


Admission Date:


Apr 20, 2018 at 19:36


Discharge Date:  Apr 22, 2018


Discharge Disposition:  Home


Principal Diagnosis:


 Right Scrotal abscess s/p I&D followed by spreading Cellulitis-Improved


Secondary Diagnoses/Problems:


Please se H&P and Hospital Progress note


Consultations:


Urology





Medication Reconciliation


New Medications:  


Lactobacillus Acidophilus (Lactinex)  Tab


2 TAB PO BID, #40 TAB





Sulfa/Trimethoprim (Bactrim Ds 800MG/160MG)  Tab


1 TAB PO BID, #20 TAB





 


Continued Medications:  


Amlodipine (Norvasc) 10 Mg Tab


20 MG PO QAM





Aspirin (Aspirin Ec) 81 Mg Tab


81 MG PO QAM





Atorvastatin (Lipitor) 20 Mg Tab


20 MG PO HS





Atorvastatin (Lipitor) 40 Mg Tab


40 MG PO HS





Cephalexin Monohydrate (Keflex) 500 Mg Cap


500 MG PO TID for 10 Days, #30 (This prescription has been renewed)





Diclofenac (Voltaren) 75 Mg Tabcr


75 MG PO BID


WITH FOOD


Lisinopril (Lisinopril) 20 Mg Tab


20 MG PO BID





Metformin HCl (Metformin HCl) 500 Mg Tab


500 MG PO BID











Admission Information


HPI (per Admitting provider):


DATE OF ADMISSION:  04/20/2018


 


CHIEF COMPLAINT:  Scrotal swelling.


 


HISTORY OF PRESENT ILLNESS: 





History obtained from patient and records.  





Medical history significant for hypertension, prediabetes, chronic back pain, 

ongoing tobacco


abuse.  





Last week, the patient noted a lump on his right scrotum, thinks it


is a pimple. Occasional manipulation.  


Progressive scrotal swelling noted extending to the groin. 


He was seen at PCP's office yesterday.  


Fluctuant R groin abscess measuring was 4 cm as per note was drained. 


Patient was sent home on Bactrim and Keflex Rx. 


Initial wound Gram stain shows gram positive cocci, GNR. 


Increased groin pain and flu-like symptoms.


Patient returned to the PCP's office today.


Sent to the Emergency Room.


Patient received IV Vancomycin and Zosyn for scrotal swelling.


 


MEDICAL HISTORY:  As above.


 


SURGICAL HISTORY:  Hammertoe procedure.


 


MEDICATIONS:  Home medications include aspirin, metformin, atorvastatin,


Keflex, Bactrim, Vicodin, and lisinopril.


 


ALLERGIES:  No drug allergies.


 


FAMILY HISTORY:  Heart disease and lymphoma.


 


PERSONAL AND SOCIAL HISTORY:  One-half pack daily.  No alcohol or beverage


intake.  .


 


REVIEW OF SYSTEMS:  As per HPI.  All 10 systems reviewed, all other ROS 

negative.


 


PHYSICAL EXAMINATION:


VITAL SIGNS:  Blood pressure was noted to be 141/80, pulse rate 66, RR 20,


temperature 37.8, O2 saturations 94% on room air.


GENERAL:  Noted to be obese, slightly uncomfortable, in no acute distress.


SKIN:  Normal color, warm.


HEENT:  Pink palpebral conjunctivae.  No ptosis.  Dry mucosa.


NECK:  Short, supple.


CHEST:  Decreased breath sounds.  No tenderness.  Occasional wheeze.


HEART:  RRR, no murmur.


ABDOMEN:  Some distention. NT


SKIN:  Right groin swelling/scrotal swelling, some tenderness, no fluctuance.


EXTREMITIES:  No LE edema, no tenderness no other gross deformities.


NEUROLOGIC: Coherent, no gross focality.


 


LABORATORY DATA:  Hemoglobin 14.2, hematocrit 41.4, white cell count 12.3,


platelets 240.  Sodium 135, potassium 4.5, chloride 103, CO2 of 35, BUN 12,


creatinine 1, glucose 102.


 


Pelvic CT showed scrotal cellulitis extending to right inguinal area and base 

of the penis;


inguinal hernia, no evidence of drainable abscess collection.  severe reactive 

lymph nodes.  


Scrotal ultrasound recommended.  


 


ASSESSMENT:


1.  Sepsis secondary to scrotal abscess/cellulitis sp drainage


gram negative rods, gram positive cocci on initial Gram stain results.


2.  Hypertension, stable.


3.  Prediabetes, well controlled as of recent HgA1c 5.7 in November 2017.


4.  Tobacco abuse.


 


PLAN:  


GMF


Follow final outpx CS.  Vancomycin and Cefepime for now.


Check lactic acid.  IVF


Local measures for cellulitis.  


Scrotal/testicular ultrasound


Urology consult for scrotal swelling.  


(ER provider already in touch with Dr. Olson.)


Nicotine patch as needed


DVT Prophylaxis.  Lovenox  subQ.  


Full code.


 


 


 











 Dictated:  04/20/18 2017 


 


Transcribed:  04/20/18 0250 <Electronically signed by Hermelindo Fernandes M.D.>


 


Signed:  04/22/18 1717 _________________________________________


 


ES Hermelindo Fernandes M.D.











Hospital Course











Sepsis secondary to scrotal abscess/cellulitis sp drainage


Gram negative rods, Gram positive cocci on initial Gram stain results.


Follow final outpx CS.  Vancomycin and Cefepime for now.


Check lactic acid-normal.  IVF


Local measures for cellulitis.  


Scrotal/testicular ultrasound;;. Generalized scrotal wall edema bilaterally 

consistent with nonspecific edema


versus cellulitis. No evidence for abscess. 


Urology consult for scrotal swelling.


Clinically better 


Blood culture -negative


No signs of spreading cellulitis


Will d/c current antibiotics and plan to continue Oral Antibiotics as advised 

by the Urologist


Keflex and Bctrim for 14 days in total


  


Hypertension, stable.





Prediabetes, well controlled as of recent HgA1c 5.7 in November 2017.





Tobacco abuse.


 


DVT Prophylaxis.  Lovenox  subQ.


 


Full code.


Discharge home today


Total time spent on discharge = 


This includes examination of the patient, discharge planning, medication 

reconciliation, and communication with other providers.





Discharge Instructions


Date of Service


Apr 22, 2018.





Admission


Reason for Admission:  Sepsis





Discharge


Discharge Diagnosis / Problem:  Right Scrotal abscess s/p I&D followed by 

spreading Cellulitis-Improved





Discharge Goals


Goal(s):  Prevent Disease Progression





Activity Recommendations


Activity Limitations:  resume your previous activity





.





Instructions / Follow-Up


Instructions / Follow-Up


Dr Heredia on 4/24/18 at 1PM.Please keep appointment with Urologist





Current Hospital Diet


Patient's current hospital diet: AHA Diet (Heart Healthy)





Discharge Diet


Recommended Diet:  Regular Diet





Pending Studies


Studies pending at discharge:  no





Laboratory Results





Hemoglobin A1c








Test


  4/20/18


15:46 Range/Units


 


 


Estimated Average Glucose 120   mg/dl


 


Hemoglobin A1c 5.8 H 4.5-5.6  %











Medical Emergencies








.


Who to Call and When:





Medical Emergencies:  If at any time you feel your situation is an emergency, 

please call 911 immediately.





.





Non-Emergent Contact


Non-Emergency issues call your:  Primary Care Provider





.





Past History


Medical & Surgical History:  


(1) Cellulitis of scrotum


(2) Sepsis


(3) Kidney stone


(4) Gallstones


.








"Provider Documentation" section prepared by Marcos Gannon.








.











<Electronically signed by Marcos Gannon M.D.>





  Signed:  04/22/18 2079





Additional Copies To


LONG Heredia M.D. (MEDICAL)